# Patient Record
Sex: MALE | Race: WHITE | NOT HISPANIC OR LATINO | Employment: OTHER | URBAN - METROPOLITAN AREA
[De-identification: names, ages, dates, MRNs, and addresses within clinical notes are randomized per-mention and may not be internally consistent; named-entity substitution may affect disease eponyms.]

---

## 2023-08-11 ENCOUNTER — TELEPHONE (OUTPATIENT)
Dept: CARDIOLOGY | Facility: CLINIC | Age: 68
End: 2023-08-11
Payer: MEDICARE

## 2023-08-11 NOTE — TELEPHONE ENCOUNTER
Caller: LISA GILLIAM    Relationship: Emergency Contact    Best call back number: [phone]    What is the best time to reach you: ANY      What was the call regarding: PATIENT'S WIFE CALLED TO ADVISE BLOOD WORK WAS DONE THROUGH [facility] IF DR. VASQUES NEEDS THAT FOR APPOINTMENT. NO OTHER INFORMATION GIVEN    Is it okay if the provider responds through MyChart: NO

## 2023-08-16 NOTE — PROGRESS NOTES
[facility] Cardiology  Consultation H&P  Kenney Blunt  1955  [address]  [facility] [address]     VISIT DATE:  08/18/23    PCP: Dayne Montoya,   [address] [place] [address]    IDENTIFICATION: A 67 y.o. male retired  from [employer].  [facility]    Problem List:  Decreased functional status  Hypertension  Dyslipidemia  7/23 295/143/48/218  CKD stage III  Cr 1.3, GFR 59  Prediabetes  7/23 A1c 6.0  7/23 elevated PSA 8.4    CC:  Chief Complaint   Patient presents with    ARREOLA    Fatigue       Allergies  No Known Allergies    Current Medications    Current Outpatient Medications:     mupirocin (BACTROBAN) 2 % cream, Apply 1 application  topically to the appropriate area as directed As Needed., Disp: , Rfl:      History of Present Illness   HPI  Kenney Blunt is a 67 y.o. year old male with the above mentioned PMH who presents for consult from Self Referring for evaluation of decreased functional capacity.  He denies any previous cardiac evaluations.  He sees his PCP but not yearly.  He is on no medications.  He goes to [email or web address] and works out for 3-1/2 hours per visit 3 times a week with a variety of exercises including elliptical and weight training.  He has noticed over the past year that he only has enough energy to go 7 miles on the elliptical as opposed to 9-10 prior.  He also gets tired going up stairs.  He denies any shortness of breath or chest pain necessarily, but just notices that he is not able to do physical things to the extent he was previously.  His LDL and total cholesterol are very high.  He denies any prior cholesterol modifying medicines.     Pt denies any chest pain, dyspnea at rest, dyspnea on exertion, orthopnea, PND, palpitations, lower extremity edema, or claudication. Pt denies history of CHF, DVT, PE, MI, CVA, TIA, or rheumatic fever.       ROS  Review of Systems   Constitutional: Positive for  "malaise/fatigue.   All other systems reviewed and are negative.    SOCIAL HX  Social History     Socioeconomic History    Marital status:    Tobacco Use    Smoking status: Never    Smokeless tobacco: Never   Vaping Use    Vaping Use: Never used   Substance and Sexual Activity    Alcohol use: Never    Drug use: Never    Sexual activity: Defer       FAMILY HX  Family History   Problem Relation Age of Onset    Heart attack Mother     Heart attack Father     Emphysema Father     Mental illness Sister     No Known Problems Sister        Vitals:    08/18/23 1509   BP: 152/80   BP Location: Left arm   Patient Position: Sitting   Pulse: 66   SpO2: 97%   Weight: 86.2 kg (190 lb)   Height: 182.9 cm (72\")     Body mass index is 25.77 kg/mý.     PHYSICAL EXAMINATION:  Constitutional:       Appearance: Healthy appearance. Not in distress.   Pulmonary:      Effort: Pulmonary effort is normal.      Breath sounds: Normal breath sounds. No wheezing. No rhonchi. No rales.   Chest:      Chest wall: Not tender to palpatation.   Cardiovascular:      PMI at left midclavicular line. Normal rate. Regular rhythm. Normal S1. Normal S2.       Murmurs: There is no murmur.      No gallop.  No click. No rub.   Pulses:     Intact distal pulses.   Edema:     Peripheral edema absent.   Skin:     General: Skin is warm and dry.   Neurological:      General: No focal deficit present.      Mental Status: Alert and oriented to person, place and time.       Diagnostic Data:    ECG 12 Lead    Date/Time: 8/18/2023 6:16 PM  Performed by: Adalberto Jeter PA-C  Authorized by: Adalberto Jeter PA-C   Comparison: not compared with previous ECG   Previous ECG: no previous ECG available  Rhythm: sinus rhythm  Ectopy: unifocal PVCs and atrial premature contractions  Rate: normal  BPM: 66  Conduction: conduction normal  ST Segments: ST segments normal  QRS axis: normal  Other: no other findings    Clinical impression: normal ECG      No results found for: " CHLPL, TRIG, HDL, LDLDIRECT  No results found for: GLUCOSE, BUN, CREATININE, NA, K, CL, CO2, CREATININE, BUN  No results found for: HGBA1C  No results found for: WBC, HGB, HCT, PLT      Advance Care Planning   ACP discussion was declined by the patient. Patient does not have an advance directive, declines further assistance.         ASSESSMENT:   Diagnosis Plan   1. Elevated blood pressure reading without diagnosis of hypertension        2. Mixed hyperlipidemia        3. Decreased functional activity tolerance            PLAN:  Elevated blood pressure  BP in office elevated at 152/80.  On recheck blood pressure 146/74.  Patient denies previous hypertension and says his blood pressures usually run okay in the 130s and 140s.  I suggested starting antihypertensive therapy today with losartan.  He declined as he thinks is an outlier.  I instructed the patient to take a blood pressure log for the next 2 weeks and call our office with the results.  He was agreeable to this.    Decreased functional capacity/dyslipidemia   The patient notes notable decrease in ability he is able to perform at the gym.  He is still going to the gym almost 11 hours a week.  He does not experience any chest pain or dyspnea on exertion.  His cardiac risk factors are relatively low but include male gender, dyslipidemia and suspected hypertension.  We will document coronary calcium score to guide further cardiac management.  I discussed with the patient that his ASCVD risk for 10-year event indicates moderate to high intensity statin.  He would like to wait until results of calcium score to decide.         Self Referring, thank you for referring Mr. Blunt for evaluation.  I have forwarded my electronically generated recommendations to you for review.  Please do not hesitate to call with any questions.      Electronically signed by Adalberto Jeter PA-C, 08/18/23, 6:13 PM EDT.

## 2023-08-18 ENCOUNTER — OFFICE VISIT (OUTPATIENT)
Dept: CARDIOLOGY | Facility: CLINIC | Age: 68
End: 2023-08-18
Payer: MEDICARE

## 2023-08-18 VITALS
DIASTOLIC BLOOD PRESSURE: 80 MMHG | BODY MASS INDEX: 25.73 KG/M2 | HEART RATE: 66 BPM | SYSTOLIC BLOOD PRESSURE: 152 MMHG | OXYGEN SATURATION: 97 % | HEIGHT: 72 IN | WEIGHT: 190 LBS

## 2023-08-18 DIAGNOSIS — E78.2 MIXED HYPERLIPIDEMIA: ICD-10-CM

## 2023-08-18 DIAGNOSIS — R68.89 DECREASED FUNCTIONAL ACTIVITY TOLERANCE: ICD-10-CM

## 2023-08-18 DIAGNOSIS — R03.0 ELEVATED BLOOD PRESSURE READING WITHOUT DIAGNOSIS OF HYPERTENSION: Primary | ICD-10-CM

## 2023-08-18 PROBLEM — E66.3 OVERWEIGHT: Status: ACTIVE | Noted: 2023-07-28

## 2023-08-18 PROBLEM — E78.5 DYSLIPIDEMIA: Status: ACTIVE | Noted: 2023-07-26

## 2023-08-18 PROBLEM — E78.5 HYPERLIPIDEMIA: Status: ACTIVE | Noted: 2023-07-28

## 2023-08-18 PROBLEM — I10 HYPERTENSIVE DISORDER: Status: RESOLVED | Noted: 2023-07-28 | Resolved: 2023-08-18

## 2023-08-18 PROBLEM — R73.03 PREDIABETES: Status: ACTIVE | Noted: 2023-07-31

## 2023-08-18 PROBLEM — Z00.00 NORMAL CARDIAC EXAM: Status: ACTIVE | Noted: 2023-08-18

## 2023-08-18 PROBLEM — I10 HYPERTENSIVE DISORDER: Status: ACTIVE | Noted: 2023-07-28

## 2023-08-18 PROBLEM — Z00.00 NORMAL CARDIAC EXAM: Status: RESOLVED | Noted: 2023-08-18 | Resolved: 2023-08-18

## 2023-08-18 PROBLEM — N40.0 BENIGN PROSTATIC HYPERPLASIA WITHOUT URINARY OBSTRUCTION: Status: ACTIVE | Noted: 2023-07-26

## 2023-08-18 RX ORDER — MUPIROCIN CALCIUM 20 MG/G
1 CREAM TOPICAL AS NEEDED
COMMUNITY

## 2023-09-07 ENCOUNTER — HOSPITAL ENCOUNTER (OUTPATIENT)
Dept: CT IMAGING | Facility: HOSPITAL | Age: 68
Discharge: HOME | End: 2023-09-07

## 2023-09-07 DIAGNOSIS — E78.2 MIXED HYPERLIPIDEMIA: ICD-10-CM

## 2023-09-07 DIAGNOSIS — R03.0 ELEVATED BLOOD PRESSURE READING WITHOUT DIAGNOSIS OF HYPERTENSION: ICD-10-CM

## 2023-09-07 DIAGNOSIS — R68.89 DECREASED FUNCTIONAL ACTIVITY TOLERANCE: ICD-10-CM

## 2023-09-07 PROCEDURE — 75571 CT HRT W/O DYE W/CA TEST: CPT

## 2023-09-08 ENCOUNTER — TELEPHONE (OUTPATIENT)
Dept: CARDIOLOGY | Facility: CLINIC | Age: 68
End: 2023-09-08
Payer: MEDICARE

## 2023-09-08 DIAGNOSIS — I25.118 CORONARY ARTERY DISEASE INVOLVING NATIVE CORONARY ARTERY OF NATIVE HEART WITH OTHER FORM OF ANGINA PECTORIS: ICD-10-CM

## 2023-09-08 DIAGNOSIS — R93.1 ELEVATED CORONARY ARTERY CALCIUM SCORE: ICD-10-CM

## 2023-09-08 DIAGNOSIS — I20.8 ANGINAL EQUIVALENT: Primary | ICD-10-CM

## 2023-09-08 DIAGNOSIS — E78.5 DYSLIPIDEMIA: ICD-10-CM

## 2023-09-08 PROBLEM — I25.10 CORONARY ARTERY DISEASE: Status: ACTIVE | Noted: 2023-09-08

## 2023-09-08 RX ORDER — ASPIRIN 81 MG/1
81 TABLET ORAL DAILY
Qty: 150 TABLET | Refills: 1 | Status: SHIPPED | OUTPATIENT
Start: 2023-09-08

## 2023-09-08 RX ORDER — ROSUVASTATIN CALCIUM 20 MG/1
20 TABLET, COATED ORAL DAILY
Qty: 90 TABLET | Refills: 3 | Status: SHIPPED | OUTPATIENT
Start: 2023-09-08

## 2023-09-08 NOTE — TELEPHONE ENCOUNTER
Patient called to discuss coronary calcium score which showed total score 365.2 indicating moderate risk of cardiovascular event in the next 5 years.  Of note, he had 264.2 calcium score in his LAD.  He initially presented to us complaining of a marked, sudden decrease in functional capacity with exercise earlier this year.  He also experiences new shortness of breath with stairs.  He has not experienced any overt chest pain.  He does have positive risk factors for ACS including age, gender, uncontrolled blood pressure and LDL >200 putting him at high risk for cardiovascular event.  His sudden decreased functional status in context of elevated coronary calcium score is suspicious for an anginal equivalent.    Plan  Initiate aspirin 81 mg daily and Crestor 20 mg daily  We will set patient up for left heart catheterization    Electronically signed by Adalberto Jeter PA-C, 09/08/23, 4:57 PM EDT.

## 2023-09-12 PROBLEM — I20.89 ANGINAL EQUIVALENT: Status: ACTIVE | Noted: 2023-09-12

## 2023-09-26 ENCOUNTER — HOSPITAL ENCOUNTER (OUTPATIENT)
Facility: HOSPITAL | Age: 68
Setting detail: HOSPITAL OUTPATIENT SURGERY
Discharge: HOME | End: 2023-09-26
Attending: INTERNAL MEDICINE | Admitting: INTERNAL MEDICINE
Payer: MEDICARE

## 2023-09-26 VITALS
SYSTOLIC BLOOD PRESSURE: 137 MMHG | DIASTOLIC BLOOD PRESSURE: 87 MMHG | RESPIRATION RATE: 21 BRPM | OXYGEN SATURATION: 96 % | WEIGHT: 188.8 LBS | HEIGHT: 72 IN | TEMPERATURE: 98 F | BODY MASS INDEX: 25.57 KG/M2 | HEART RATE: 60 BPM

## 2023-09-26 DIAGNOSIS — E78.5 DYSLIPIDEMIA: ICD-10-CM

## 2023-09-26 DIAGNOSIS — R93.1 ELEVATED CORONARY ARTERY CALCIUM SCORE: ICD-10-CM

## 2023-09-26 DIAGNOSIS — I25.118 CORONARY ARTERY DISEASE INVOLVING NATIVE CORONARY ARTERY OF NATIVE HEART WITH OTHER FORM OF ANGINA PECTORIS: ICD-10-CM

## 2023-09-26 DIAGNOSIS — I20.89 ANGINAL EQUIVALENT: ICD-10-CM

## 2023-09-26 LAB
ALBUMIN SERPL-MCNC: 4.2 G/DL (ref 3.5–5.2)
ALBUMIN/GLOB SERPL: 2.1 G/DL
ALP SERPL-CCNC: 71 U/L (ref 39–117)
ALT SERPL W P-5'-P-CCNC: 14 U/L (ref 1–41)
ANION GAP SERPL CALCULATED.3IONS-SCNC: 8 MMOL/L (ref 5–15)
AST SERPL-CCNC: 19 U/L (ref 1–40)
BILIRUB SERPL-MCNC: 0.6 MG/DL (ref 0–1.2)
BUN BLDA-MCNC: 19 MG/DL (ref 8–26)
BUN SERPL-MCNC: 17 MG/DL (ref 8–23)
BUN/CREAT SERPL: 14.3 (ref 7–25)
CA-I BLDA-SCNC: 1.17 MMOL/L (ref 1.2–1.32)
CALCIUM SPEC-SCNC: 8.7 MG/DL (ref 8.6–10.5)
CATH EF ESTIMATED: 50 %
CHLORIDE BLDA-SCNC: 101 MMOL/L (ref 98–109)
CHLORIDE SERPL-SCNC: 103 MMOL/L (ref 98–107)
CHOLEST SERPL-MCNC: 212 MG/DL (ref 0–200)
CO2 BLDA-SCNC: 26 MMOL/L (ref 24–29)
CO2 SERPL-SCNC: 28 MMOL/L (ref 22–29)
CREAT BLDA-MCNC: 1.3 MG/DL (ref 0.6–1.3)
CREAT SERPL-MCNC: 1.19 MG/DL (ref 0.76–1.27)
DEPRECATED RDW RBC AUTO: 42.3 FL (ref 37–54)
EGFRCR SERPLBLD CKD-EPI 2021: 60.2 ML/MIN/1.73
EGFRCR SERPLBLD CKD-EPI 2021: 67 ML/MIN/1.73
ERYTHROCYTE [DISTWIDTH] IN BLOOD BY AUTOMATED COUNT: 12.1 % (ref 12.3–15.4)
GLOBULIN UR ELPH-MCNC: 2 GM/DL
GLUCOSE BLDC GLUCOMTR-MCNC: 99 MG/DL (ref 70–130)
GLUCOSE SERPL-MCNC: 103 MG/DL (ref 65–99)
HBA1C MFR BLD: 5.7 % (ref 4.8–5.6)
HCT VFR BLD AUTO: 42.8 % (ref 37.5–51)
HCT VFR BLDA CALC: 42 % (ref 38–51)
HDLC SERPL-MCNC: 50 MG/DL (ref 40–60)
HGB BLD-MCNC: 14.3 G/DL (ref 13–17.7)
HGB BLDA-MCNC: 14.3 G/DL (ref 12–17)
LDLC SERPL CALC-MCNC: 140 MG/DL (ref 0–100)
LDLC/HDLC SERPL: 2.74 {RATIO}
MCH RBC QN AUTO: 31.6 PG (ref 26.6–33)
MCHC RBC AUTO-ENTMCNC: 33.4 G/DL (ref 31.5–35.7)
MCV RBC AUTO: 94.5 FL (ref 79–97)
PLATELET # BLD AUTO: 181 10*3/MM3 (ref 140–450)
PMV BLD AUTO: 11 FL (ref 6–12)
POTASSIUM BLDA-SCNC: 4.3 MMOL/L (ref 3.5–4.9)
POTASSIUM SERPL-SCNC: 4.4 MMOL/L (ref 3.5–5.2)
PROT SERPL-MCNC: 6.2 G/DL (ref 6–8.5)
RBC # BLD AUTO: 4.53 10*6/MM3 (ref 4.14–5.8)
SODIUM BLD-SCNC: 139 MMOL/L (ref 138–146)
SODIUM SERPL-SCNC: 139 MMOL/L (ref 136–145)
TRIGL SERPL-MCNC: 125 MG/DL (ref 0–150)
VLDLC SERPL-MCNC: 22 MG/DL (ref 5–40)
WBC NRBC COR # BLD: 5.49 10*3/MM3 (ref 3.4–10.8)

## 2023-09-26 PROCEDURE — C1769 GUIDE WIRE: HCPCS | Performed by: INTERNAL MEDICINE

## 2023-09-26 PROCEDURE — C1894 INTRO/SHEATH, NON-LASER: HCPCS | Performed by: INTERNAL MEDICINE

## 2023-09-26 PROCEDURE — 85027 COMPLETE CBC AUTOMATED: CPT | Performed by: NURSE PRACTITIONER

## 2023-09-26 PROCEDURE — 83036 HEMOGLOBIN GLYCOSYLATED A1C: CPT | Performed by: NURSE PRACTITIONER

## 2023-09-26 PROCEDURE — 80053 COMPREHEN METABOLIC PANEL: CPT | Performed by: NURSE PRACTITIONER

## 2023-09-26 PROCEDURE — 25510000001 IOPAMIDOL PER 1 ML: Performed by: INTERNAL MEDICINE

## 2023-09-26 PROCEDURE — 93458 L HRT ARTERY/VENTRICLE ANGIO: CPT | Performed by: INTERNAL MEDICINE

## 2023-09-26 PROCEDURE — 85014 HEMATOCRIT: CPT

## 2023-09-26 PROCEDURE — 25010000002 HEPARIN (PORCINE) PER 1000 UNITS: Performed by: INTERNAL MEDICINE

## 2023-09-26 PROCEDURE — 80047 BASIC METABLC PNL IONIZED CA: CPT

## 2023-09-26 PROCEDURE — 80061 LIPID PANEL: CPT | Performed by: NURSE PRACTITIONER

## 2023-09-26 PROCEDURE — 25010000002 FENTANYL CITRATE (PF) 50 MCG/ML SOLUTION: Performed by: INTERNAL MEDICINE

## 2023-09-26 PROCEDURE — 25010000002 MIDAZOLAM PER 1 MG: Performed by: INTERNAL MEDICINE

## 2023-09-26 RX ORDER — SODIUM CHLORIDE 0.9 % (FLUSH) 0.9 %
1-10 SYRINGE (ML) INJECTION AS NEEDED
Status: DISCONTINUED | OUTPATIENT
Start: 2023-09-26 | End: 2023-09-26 | Stop reason: HOSPADM

## 2023-09-26 RX ORDER — NICARDIPINE HCL-0.9% SOD CHLOR 1 MG/10 ML
SYRINGE (ML) INTRAVENOUS
Status: DISCONTINUED | OUTPATIENT
Start: 2023-09-26 | End: 2023-09-26 | Stop reason: HOSPADM

## 2023-09-26 RX ORDER — ASPIRIN 325 MG
325 TABLET, DELAYED RELEASE (ENTERIC COATED) ORAL DAILY
Status: DISCONTINUED | OUTPATIENT
Start: 2023-09-27 | End: 2023-09-26 | Stop reason: HOSPADM

## 2023-09-26 RX ORDER — ASPIRIN 325 MG
325 TABLET ORAL ONCE
Status: COMPLETED | OUTPATIENT
Start: 2023-09-26 | End: 2023-09-26

## 2023-09-26 RX ORDER — SODIUM CHLORIDE 9 MG/ML
100 INJECTION, SOLUTION INTRAVENOUS CONTINUOUS
Status: DISCONTINUED | OUTPATIENT
Start: 2023-09-27 | End: 2023-09-26 | Stop reason: HOSPADM

## 2023-09-26 RX ORDER — NITROGLYCERIN 0.4 MG/1
0.4 TABLET SUBLINGUAL
Status: DISCONTINUED | OUTPATIENT
Start: 2023-09-26 | End: 2023-09-26 | Stop reason: HOSPADM

## 2023-09-26 RX ORDER — SODIUM CHLORIDE 0.9 % (FLUSH) 0.9 %
10 SYRINGE (ML) INJECTION EVERY 12 HOURS SCHEDULED
Status: DISCONTINUED | OUTPATIENT
Start: 2023-09-26 | End: 2023-09-26 | Stop reason: HOSPADM

## 2023-09-26 RX ORDER — FENTANYL CITRATE 50 UG/ML
INJECTION, SOLUTION INTRAMUSCULAR; INTRAVENOUS
Status: DISCONTINUED | OUTPATIENT
Start: 2023-09-26 | End: 2023-09-26 | Stop reason: HOSPADM

## 2023-09-26 RX ORDER — MIDAZOLAM HYDROCHLORIDE 1 MG/ML
INJECTION INTRAMUSCULAR; INTRAVENOUS
Status: DISCONTINUED | OUTPATIENT
Start: 2023-09-26 | End: 2023-09-26 | Stop reason: HOSPADM

## 2023-09-26 RX ORDER — ONDANSETRON 2 MG/ML
4 INJECTION INTRAMUSCULAR; INTRAVENOUS EVERY 6 HOURS PRN
Status: DISCONTINUED | OUTPATIENT
Start: 2023-09-26 | End: 2023-09-26 | Stop reason: HOSPADM

## 2023-09-26 RX ORDER — LIDOCAINE HYDROCHLORIDE 10 MG/ML
INJECTION, SOLUTION EPIDURAL; INFILTRATION; INTRACAUDAL; PERINEURAL
Status: DISCONTINUED | OUTPATIENT
Start: 2023-09-26 | End: 2023-09-26 | Stop reason: HOSPADM

## 2023-09-26 RX ORDER — SODIUM CHLORIDE 9 MG/ML
40 INJECTION, SOLUTION INTRAVENOUS AS NEEDED
Status: DISCONTINUED | OUTPATIENT
Start: 2023-09-26 | End: 2023-09-26 | Stop reason: HOSPADM

## 2023-09-26 RX ORDER — HEPARIN SODIUM 1000 [USP'U]/ML
INJECTION, SOLUTION INTRAVENOUS; SUBCUTANEOUS
Status: DISCONTINUED | OUTPATIENT
Start: 2023-09-26 | End: 2023-09-26 | Stop reason: HOSPADM

## 2023-09-26 RX ORDER — ACETAMINOPHEN 325 MG/1
650 TABLET ORAL EVERY 4 HOURS PRN
Status: DISCONTINUED | OUTPATIENT
Start: 2023-09-26 | End: 2023-09-26 | Stop reason: HOSPADM

## 2023-09-26 RX ADMIN — SODIUM CHLORIDE 100 ML/HR: 9 INJECTION, SOLUTION INTRAVENOUS at 09:05

## 2023-09-26 RX ADMIN — ASPIRIN 325 MG: 325 TABLET ORAL at 09:05

## 2023-09-26 NOTE — Clinical Note
Hemostasis started on the right radial artery. R-Band was used in achieving hemostasis. Radial compression device applied to vessel. Hemostasis achieved successfully. Closure device additional comment: 10

## 2023-09-26 NOTE — H&P
[facility]  HISTORY AND PHYSICAL    Kenney Blunt  : 1955  MRN:[id number]    Date of Admission:2023    PCP: Dayne Montoya DO    IDENTIFICATION: A 67 y.o.  male from [place], [place]    Chief complaint: Decreased functional capacity and moderate calcium score    PROBLEM LIST:   Problem List:  Decreased functional status  Hypertension  Dyslipidemia   295/143/48/218  CKD stage III  Cr 1.3, GFR 59  Prediabetes   A1c 6.0   elevated PSA 8.4    ALLERGIES: No Known Allergies    HOME MEDICINES:   Prior to Admission Medications       Prescriptions Last Dose Informant Patient Reported? Taking?    aspirin 81 MG EC tablet   No No    Take 1 tablet by mouth Daily.    mupirocin (BACTROBAN) 2 % cream   Yes No    Apply 1 application  topically to the appropriate area as directed As Needed.    rosuvastatin (CRESTOR) 20 MG tablet   No No    Take 1 tablet by mouth Daily.            Subjective     HPI: Kenney Blunt is a 67 y.o. male with a past medical history listed above who was seen by Adalberto Jeter PA-C for decreased functional status.  Patient goes to the gym around 11 hours/week and has noticed over the last year he does not have the same energy as he had before.  He is now experiencing shortness of breath with stairs.  On 2023 he had a calcium score of 365.2 with 264.2 in his LAD.  This makes him moderate risk.  Risk factors for ACS include age, gender, LDL greater than 200, and hypertension.  Patient denies chest pain, palpitations, lightheadedness, dizziness, and syncope.  Patient has been able to tolerate losartan.  He had a prostate biopsy yesterday with some bleeding afterwards.  Bleeding has currently stopped.    ROS: All systems have been reviewed and are negative with the exception of those mentioned in the HPI and problem list above.    Past Medical History: No past medical history on file.   Surgical History:   Past Surgical History:    Procedure Laterality Date    APPENDECTOMY      HERNIA REPAIR      TONSILLECTOMY       Social History:   Social History     Socioeconomic History    Marital status:    Tobacco Use    Smoking status: Never     Passive exposure: Never    Smokeless tobacco: Never   Vaping Use    Vaping Use: Never used   Substance and Sexual Activity    Alcohol use: Never    Drug use: Never    Sexual activity: Defer     Family History:   Family History   Problem Relation Age of Onset    Heart attack Mother     Heart attack Father     Emphysema Father     Mental illness Sister     No Known Problems Sister        Objective   /90, HR 56, O2 98% on room air    PHYSICAL EXAM:  CONSTITUTIONAL: Well nourished, cooperative, in no acute distress  HEENT: Normocephalic, atraumatic, PERRLA, no JVD  CARDIOVASCULAR:  Regular rhythm and normal rate, no murmur, gallop, rub.   RESPIRATORY: Clear to auscultation, normal respiratory effort, no wheezing, rales or ronchi, on room air  GI: Soft, nontender, normal bowel sounds  EXTREMITIES: No gross deformities, no edema. Peripheral pulses are present and equal bilaterally  SKIN: Warm, dry. No bleeding, bruising or rash  NEUROLOGICAL: No focal deficits  PSYCHIATRIC: Normal mood and affect. Behavior is normal     Labs/Diagnostic Data                                        Labs currently pending.  Will review when available.    EKG/Telemetry: Sinus bradycardia with occasional PACs    Radiology Data:   No radiology results for the last day        Current Medications:           Assessment:     1.  Coronary calcium score 365.2-moderate risk  New onset dyspnea on exertion, rapidly progressive.  Likely angina  2.  Hypertension  3.  Hyperlipidemia    Plan:     Patient is here today for left heart catheterization +/- CBI via right radial access.  Procedure, risks, and benefits have been discussed with the patient and he is agreeable to proceed.  Further recommendations to follow.     Electronically  signed by MARITZA Gilliland, 09/26/23, 8:14 AM EDT.     Please note that portions of this note were dictated utilizing Dragon dictation.

## 2023-11-16 ENCOUNTER — TELEPHONE (OUTPATIENT)
Dept: CARDIOLOGY | Facility: CLINIC | Age: 68
End: 2023-11-16
Payer: MEDICARE

## 2023-11-16 NOTE — TELEPHONE ENCOUNTER
Faxed via release feature to number listed for doctor.     Zainab Blunt (on verbal release) called. No answer, LVM that request was completed.

## 2023-11-16 NOTE — TELEPHONE ENCOUNTER
Caller: LISA GILLIAM    Relationship: Emergency Contact    Best call back number: [phone]     What form or medical record are you requesting: LETTER SHOWING WHERE PATIENT WAS TOLD HE HAD THE NODULE ON HIS LUNG     Who is requesting this form or medical record from you: DR. DAYAN SHORTLEXIS OFFICE     How would you like to receive the form or medical records (pick-up, mail, fax): FAX  If fax, what is the fax number: [phone]    Timeframe paperwork needed: ASAP BEFORE MONDAY 11/20/23    Additional notes: PATIENT WIFE CALLED IN TO SEE IF WE COULD FAX OVER THE LETTER THAT WAS SENT TO PATIENT ABOUT THE LUNG NODULE THAT WAS FOUND ON HIS LUNG OVER TO THE PULMONOLOGIST THAT HE IS SEEING ON 11/20/23.     SENDING AS HIGH PRIORITY DUE TO PATIENT REQUEST

## 2024-02-22 NOTE — PROGRESS NOTES
"[facility] Cardiology  Office Progress Note  Kenney Blunt  1955  [address] [place] [address]       Visit Date: 02/23/24    PCP: Dayne Montoya DO  [address] [place] [address]    IDENTIFICATION: A 68 y.o.  retired   from [place], [place]      PROBLEM LIST:   CAD   9/23 CT cardiac calcium score = 365  9/23 LHC: Nonflow-limiting CAD, 30% mid LAD, 20% mid RCA, EF 50% (Ana)  Hypertension  Dyslipidemia  7/23 295/143/48/218  9/23 [id number]-, on rosuvastatin 20  CKD stage III  Cr 1.3, GFR 59  Prediabetes  7/23 A1c 6.0  9/23 A1c 5.7  7/23 elevated PSA 8.4 bx Dr Luciano      CC:   Chief Complaint   Patient presents with    Elevated blood pressure reading without diagnosis of hypert       Allergies  No Known Allergies    Current Medications  Current Outpatient Medications   Medication Instructions    aspirin 81 mg, Oral, Daily    mupirocin (BACTROBAN) 2 % cream 1 application , Topical, As Needed    rosuvastatin (CRESTOR) 20 mg, Oral, Daily        History of Present Illness   Kenney Blunt is a 68 y.o. year old male here for follow up.  He states his initial evaluation was regarding exercise capacity decrease of 20% within the last few years.  He notes no overt chest discomfort or dyspnea with activity.  He is planning a [employer] cruise later this year    OBJECTIVE:  Vitals:    02/23/24 1454   BP: 130/90   BP Location: Left arm   Patient Position: Sitting   Cuff Size: Adult   Pulse: 61   SpO2: 97%   Weight: 86.3 kg (190 lb 3.2 oz)   Height: 182.9 cm (72\")     Body mass index is 25.8 kg/m².    Constitutional:       Appearance: Healthy appearance. Not in distress.   Neck:      Vascular: No JVR. JVD normal.   Pulmonary:      Effort: Pulmonary effort is normal.      Breath sounds: Normal breath sounds. No wheezing. No rhonchi. No rales.   Chest:      Chest wall: Not tender to palpatation.   Cardiovascular:      PMI at left midclavicular " line. Normal rate. Regular rhythm. Normal S1. Normal S2.       Murmurs: There is no murmur.      No gallop.  No click. No rub.   Pulses:     Intact distal pulses.   Edema:     Peripheral edema absent.   Abdominal:      General: Bowel sounds are normal.      Palpations: Abdomen is soft.      Tenderness: There is no abdominal tenderness.   Musculoskeletal: Normal range of motion.         General: No tenderness. Skin:     General: Skin is warm and dry.   Neurological:      General: No focal deficit present.      Mental Status: Alert and oriented to person, place and time.         Diagnostic Data:  Procedures        ASSESSMENT:   Diagnosis Plan   1. Coronary artery disease involving native coronary artery of native heart without angina pectoris        2. Dyslipidemia            PLAN:  CAD nonobstructive by catheterization continued medical management    Dyslipidemia improvement on statin with familial component recommended target LDL of at least less than 70- recommended Zetia plus or minus PCSK9 inhibitor patient patient defers at current        Walter Castillo MD, [facility]

## 2024-02-23 ENCOUNTER — OFFICE VISIT (OUTPATIENT)
Dept: CARDIOLOGY | Facility: CLINIC | Age: 69
End: 2024-02-23
Payer: MEDICARE

## 2024-02-23 VITALS
DIASTOLIC BLOOD PRESSURE: 90 MMHG | SYSTOLIC BLOOD PRESSURE: 130 MMHG | WEIGHT: 190.2 LBS | BODY MASS INDEX: 25.76 KG/M2 | HEART RATE: 61 BPM | HEIGHT: 72 IN | OXYGEN SATURATION: 97 %

## 2024-02-23 DIAGNOSIS — E78.5 DYSLIPIDEMIA: ICD-10-CM

## 2024-02-23 DIAGNOSIS — I25.10 CORONARY ARTERY DISEASE INVOLVING NATIVE CORONARY ARTERY OF NATIVE HEART WITHOUT ANGINA PECTORIS: Primary | ICD-10-CM

## 2024-02-23 PROCEDURE — 99213 OFFICE O/P EST LOW 20 MIN: CPT | Performed by: INTERNAL MEDICINE

## 2024-10-29 RX ORDER — ROSUVASTATIN CALCIUM 20 MG/1
20 TABLET, COATED ORAL DAILY
Qty: 90 TABLET | Refills: 3 | Status: SHIPPED | OUTPATIENT
Start: 2024-10-29

## (undated) DEVICE — CATH DIAG EXPO M/ PK 6FR FL4/FR4 PIG 3PK

## (undated) DEVICE — ADULT, W/LG. BACK PAD, RADIOTRANSPARENT ELEMENT AND LEAD WIRE: Brand: DEFIBRILLATION ELECTRODES

## (undated) DEVICE — CATH DIAG EXPO .056 FL3.5 6F 100CM

## (undated) DEVICE — NDL ANGIOGR ADV THN SMOTH SGLWALL 21G 1.5

## (undated) DEVICE — PK CATH CARD 10

## (undated) DEVICE — MODEL BT2000 P/N 700287-012KIT CONTENTS: MANIFOLD WITH SALINE AND CONTRAST PORTS, SALINE TUBING WITH SPIKE AND HAND SYRINGE, TRANSDUCER: Brand: BT2000 AUTOMATED MANIFOLD KIT

## (undated) DEVICE — GLIDESHEATH BASIC HYDROPHILIC COATED INTRODUCER SHEATH: Brand: GLIDESHEATH

## (undated) DEVICE — MODEL AT P65, P/N 701554-001KIT CONTENTS: HAND CONTROLLER, 3-WAY HIGH-PRESSURE STOPCOCK WITH ROTATING END AND PREMIUM HIGH-PRESSURE TUBING: Brand: ANGIOTOUCH® KIT

## (undated) DEVICE — GW INQWIRE FC PTFE STD J/1.5 .035 260

## (undated) DEVICE — DEV COMPR RADL PRELUDESYNCEZ 30ML 32CM